# Patient Record
Sex: FEMALE | Race: BLACK OR AFRICAN AMERICAN | NOT HISPANIC OR LATINO | Employment: UNEMPLOYED | URBAN - METROPOLITAN AREA
[De-identification: names, ages, dates, MRNs, and addresses within clinical notes are randomized per-mention and may not be internally consistent; named-entity substitution may affect disease eponyms.]

---

## 2017-10-11 ENCOUNTER — CLINICAL SUPPORT (OUTPATIENT)
Dept: SMOKING CESSATION | Facility: CLINIC | Age: 57
End: 2017-10-11
Payer: COMMERCIAL

## 2017-10-11 DIAGNOSIS — F17.200 NICOTINE DEPENDENCE: Primary | ICD-10-CM

## 2017-10-11 PROCEDURE — 99404 PREV MED CNSL INDIV APPRX 60: CPT | Mod: S$GLB,,,

## 2017-10-11 PROCEDURE — 99999 PR PBB SHADOW E&M-EST. PATIENT-LVL I: CPT | Mod: PBBFAC,,,

## 2017-10-11 RX ORDER — DM/P-EPHED/ACETAMINOPH/DOXYLAM 30-7.5/3
2 LIQUID (ML) ORAL
Qty: 96 LOZENGE | Refills: 0 | Status: SHIPPED | OUTPATIENT
Start: 2017-10-11 | End: 2017-10-11 | Stop reason: SDUPTHER

## 2017-10-11 RX ORDER — IBUPROFEN 200 MG
1 TABLET ORAL DAILY
Qty: 28 PATCH | Refills: 0 | Status: SHIPPED | OUTPATIENT
Start: 2017-10-11 | End: 2017-11-10

## 2017-10-11 RX ORDER — DM/P-EPHED/ACETAMINOPH/DOXYLAM 30-7.5/3
2 LIQUID (ML) ORAL
Qty: 96 LOZENGE | Refills: 0 | Status: SHIPPED | OUTPATIENT
Start: 2017-10-11 | End: 2017-11-10

## 2017-10-11 NOTE — Clinical Note
Pt seen at intake today. She currently smokes 20 cigs/day. Discussed tobacco cessation medication of 21 mg nicotine patch QD and 2 mg nicotine lozenge PRN (1-2 per hour in place of cigarettes). Pt started on rate reduction and wait time of 15 min prior to smoking. Exhaled carbon monoxide level was 18 (0-6 non-smoker). Will see pt back in group in 1 wk.

## 2017-10-11 NOTE — PROGRESS NOTES
See Tobacco Cessation Intake Form for patient assessment and recommendations.  Exhaled carbon monoxide level was 18 ppm per [facility].

## 2017-10-12 ENCOUNTER — TELEPHONE (OUTPATIENT)
Dept: SMOKING CESSATION | Facility: CLINIC | Age: 57
End: 2017-10-12

## 2017-10-12 NOTE — TELEPHONE ENCOUNTER
Returned patient's call. She was told by her son that she could not smoke while wearing nicotine patch. Clarified and explained that she could. But iIf she feels any dizziness, increased heart rate, sweating to put the cigarette out. Not to remove the patch continue smoking and put patch back on.

## 2017-10-18 ENCOUNTER — TELEPHONE (OUTPATIENT)
Dept: SMOKING CESSATION | Facility: CLINIC | Age: 57
End: 2017-10-18

## 2017-10-18 NOTE — TELEPHONE ENCOUNTER
Pt called to let me know why she could not make last nights group. She states that she will be there next Monday.

## 2017-10-23 ENCOUNTER — HOSPITAL ENCOUNTER (EMERGENCY)
Facility: HOSPITAL | Age: 57
Discharge: HOME | End: 2017-10-23
Attending: EMERGENCY MEDICINE
Payer: MEDICARE

## 2017-10-23 VITALS
WEIGHT: 182 LBS | DIASTOLIC BLOOD PRESSURE: 74 MMHG | BODY MASS INDEX: 27.58 KG/M2 | SYSTOLIC BLOOD PRESSURE: 156 MMHG | RESPIRATION RATE: 18 BRPM | OXYGEN SATURATION: 99 % | TEMPERATURE: 98 F | HEART RATE: 59 BPM | HEIGHT: 68 IN

## 2017-10-23 DIAGNOSIS — K04.01 ACUTE PULPITIS: Primary | ICD-10-CM

## 2017-10-23 PROCEDURE — 99283 EMERGENCY DEPT VISIT LOW MDM: CPT

## 2017-10-23 PROCEDURE — 25000003 PHARM REV CODE 250: Performed by: EMERGENCY MEDICINE

## 2017-10-23 RX ORDER — HYDROCODONE BITARTRATE AND ACETAMINOPHEN 5; 325 MG/1; MG/1
1 TABLET ORAL EVERY 6 HOURS PRN
Qty: 20 TABLET | Refills: 0 | Status: SHIPPED | OUTPATIENT
Start: 2017-10-23

## 2017-10-23 RX ORDER — PENICILLIN V POTASSIUM 500 MG/1
500 TABLET, FILM COATED ORAL 4 TIMES DAILY
Qty: 28 TABLET | Refills: 0 | Status: SHIPPED | OUTPATIENT
Start: 2017-10-23 | End: 2017-10-30

## 2017-10-23 RX ORDER — HYDROCODONE BITARTRATE AND ACETAMINOPHEN 5; 325 MG/1; MG/1
1 TABLET ORAL
Status: COMPLETED | OUTPATIENT
Start: 2017-10-23 | End: 2017-10-23

## 2017-10-23 RX ADMIN — HYDROCODONE BITARTRATE AND ACETAMINOPHEN 1 TABLET: 5; 325 TABLET ORAL at 05:10

## 2017-10-23 NOTE — DISCHARGE INSTRUCTIONS
Take medications as directed.  You may also take ibuprofen 600mg every 6 hours.  See your dentist as soon as possible.

## 2017-10-23 NOTE — ED NOTES
Draining abscess to right lower gum line that began 1 day ago and worsened today.  Denies fever.  Having dental surgery in 2 weeks.

## 2017-10-31 ENCOUNTER — TELEPHONE (OUTPATIENT)
Dept: SMOKING CESSATION | Facility: CLINIC | Age: 57
End: 2017-10-31

## 2017-10-31 NOTE — TELEPHONE ENCOUNTER
Called patient to check up on her progress with tobacco cessation. She has not been coming to group. 2nd attempt

## 2017-11-17 NOTE — ED PROVIDER NOTES
Encounter Date: 10/23/2017       History     Chief Complaint   Patient presents with    Oral Swelling     patient states she has a knot to her right bottom gum     57F presents with swelling to right lower gum x 2 days.  No drainage.  It is painful.  She has very poor dentition and is scheduled for dental surgery soon.            Review of patient's allergies indicates:  No Known Allergies  Past Medical History:   Diagnosis Date    Anxiety     Depression     Hypertension      Past Surgical History:   Procedure Laterality Date    MULTIPLE TOOTH EXTRACTIONS       History reviewed. No pertinent family history.  Social History   Substance Use Topics    Smoking status: Current Every Day Smoker     Packs/day: 0.50    Smokeless tobacco: Never Used    Alcohol use No     Review of Systems   Constitutional: Negative for fever.   Gastrointestinal: Negative for nausea and vomiting.   All other systems reviewed and are negative.      Physical Exam     Initial Vitals [10/23/17 1446]   BP Pulse Resp Temp SpO2   136/87 83 16 97.8 °F (36.6 °C) 99 %      MAP       103.33         Physical Exam    Nursing note and vitals reviewed.  Constitutional: She appears well-developed and well-nourished. No distress.   HENT:   Head: Normocephalic and atraumatic.   Mouth/Throat: Oropharynx is clear and moist.   No dental abscess; R lower teeth are caried and tender   Eyes: Conjunctivae are normal.   Neck: Normal range of motion.   Pulmonary/Chest: No respiratory distress.   Musculoskeletal: Normal range of motion.   Neurological: She is alert and oriented to person, place, and time.   Skin: Skin is warm and dry.   Psychiatric: She has a normal mood and affect. Her behavior is normal.         ED Course   Procedures  Labs Reviewed - No data to display          Medical Decision Making:   ED Management:  R lower gum swelling and tenderness - no abscess.  Exam is consistent with acute pulpitis.  Treat with abx.                   ED Course       Clinical Impression:   The encounter diagnosis was Acute pulpitis.                           Berenice Larson MD  11/16/17 3020

## 2017-11-30 ENCOUNTER — CLINICAL SUPPORT (OUTPATIENT)
Dept: SMOKING CESSATION | Facility: CLINIC | Age: 57
End: 2017-11-30
Payer: COMMERCIAL

## 2017-11-30 DIAGNOSIS — F17.200 NICOTINE DEPENDENCE: Primary | ICD-10-CM

## 2017-11-30 PROCEDURE — 99407 BEHAV CHNG SMOKING > 10 MIN: CPT | Mod: S$GLB,,,

## 2017-11-30 PROCEDURE — 99999 PR PBB SHADOW E&M-EST. PATIENT-LVL I: CPT | Mod: PBBFAC,,,

## 2018-05-29 ENCOUNTER — CLINICAL SUPPORT (OUTPATIENT)
Dept: SMOKING CESSATION | Facility: CLINIC | Age: 58
End: 2018-05-29
Payer: COMMERCIAL

## 2018-05-29 DIAGNOSIS — F17.200 NICOTINE DEPENDENCE: Primary | ICD-10-CM

## 2018-05-29 PROCEDURE — 99407 BEHAV CHNG SMOKING > 10 MIN: CPT | Mod: S$GLB,,, | Performed by: INTERNAL MEDICINE

## 2018-05-29 NOTE — PROGRESS NOTES
Spoke with patient today in regard to smoking cessation progress for 3/6 month follow up, she states not tobacco free at this time. Patient states she has cut down a lot using NRTs from previous visits. Informed patient of benefit period, follow up at 1 year, and contact information if any further help or support is needed. Will complete smart form for 3/6 month follow up on Quit attempt #1.

## 2018-06-08 ENCOUNTER — HOSPITAL ENCOUNTER (EMERGENCY)
Facility: HOSPITAL | Age: 58
Discharge: HOME | End: 2018-06-08
Attending: EMERGENCY MEDICINE
Payer: MEDICARE

## 2018-06-08 VITALS
WEIGHT: 174 LBS | RESPIRATION RATE: 16 BRPM | BODY MASS INDEX: 27.31 KG/M2 | OXYGEN SATURATION: 97 % | HEART RATE: 78 BPM | DIASTOLIC BLOOD PRESSURE: 84 MMHG | TEMPERATURE: 98 F | SYSTOLIC BLOOD PRESSURE: 165 MMHG | HEIGHT: 67 IN

## 2018-06-08 DIAGNOSIS — F41.9 ANXIETY: Primary | ICD-10-CM

## 2018-06-08 PROCEDURE — 99282 EMERGENCY DEPT VISIT SF MDM: CPT | Mod: ,,, | Performed by: EMERGENCY MEDICINE

## 2018-06-08 PROCEDURE — 99283 EMERGENCY DEPT VISIT LOW MDM: CPT

## 2018-06-09 NOTE — ED PROVIDER NOTES
"SCRIBE #1 NOTE: I, Yin Zamarripa , am scribing for, and in the presence of,  Pearl Joseph MD. I have scribed the entire note.       CC: Oral Swelling (Pt c/o "bumps on back of tongue". )      HPI: Agueda Conroy is a 57 y.o.  female smoker with HTN and anxiety, who presents to the ED complaining of sensation of abnormal "bumps on the back of [her] tongue." The patient endorses associated anxiety. She denies localized pain, difficulty swallowing, dyspnea, SOB, rhinorrhea, cough, fever, rash, or wound to her tongue. She denies any exposure to new medications, foods, or topicals. Patient reports examining her tongue in the mirror, finding "bumps", and then appreciating a foreign body sensation with swallowing without pain or discomfort.        Past Medical History:   Diagnosis Date    Anxiety     Depression     Hypertension      Past Surgical History:   Procedure Laterality Date    MULTIPLE TOOTH EXTRACTIONS       No family history on file.  No current facility-administered medications on file prior to encounter.      Current Outpatient Prescriptions on File Prior to Encounter   Medication Sig Dispense Refill    clonazepam (KLONOPIN) 0.5 MG tablet Take 0.5 mg by mouth 2 (two) times daily as needed.      hydrocodone-acetaminophen 5-325mg (NORCO) 5-325 mg per tablet Take 1 tablet by mouth every 6 (six) hours as needed for Pain. 20 tablet 0    hydrOXYzine (ATARAX) 25 MG tablet Take 2 tablets (50 mg total) by mouth every 6 (six) hours. 12 tablet 0    ketoprofen (ORUDIS) 50 MG capsule Take 1 capsule (50 mg total) by mouth every 6 (six) hours. 30 capsule 0    levothyroxine (SYNTHROID) 100 MCG tablet Take 100 mcg by mouth once daily.      metoprolol tartrate (LOPRESSOR) 50 MG tablet Take 50 mg by mouth 2 (two) times daily.      verapamil (CALAN) 80 MG tablet Take 80 mg by mouth 3 (three) times daily.       Patient has no known allergies.  Social History     Social History    Marital status: " "     Spouse name: N/A    Number of children: N/A    Years of education: N/A     Social History Main Topics    Smoking status: Current Every Day Smoker     Packs/day: 0.50    Smokeless tobacco: Never Used    Alcohol use No    Drug use: Unknown    Sexual activity: Not Asked     Other Topics Concern    None     Social History Narrative    None       ROS:     Constitutional : neg  HEENT: "Bumps" on tongue.  Resp neg  Cardiac  neg  GI neg   neg  Neuro neg  Heme/Immune: neg  Endo neg  Skin neg  Psych: Anxiety.    PHYSICAL EXAM:  Vitals:    06/08/18 1855   BP: (!) 165/84   Pulse: 78   Resp: 16   Temp: 98.4 °F (36.9 °C)         PHYSICAL EXAM:   general:  tearful, anxious.  VS: triage VS reviewed  HEENT: NC/AT, PERRL, EOMI. Tongue is normal without masses. Oropharynx is symmetrical without erythema and exudates. The abnormalities that the patient is concerned about are the circumvallate papillae at the back of the tongue.  Neck: trachea midline  CV: RRR, no m/r/g, no LE pitting edema  Resp: CTAB  ABD:  ND, + normal BS, NT  Renal: No CVAT  Neuro: AAO x 3, 5/5 muscle strength in upper and lower extremities, sensation grossly intact, face symmetric, speech normal  Ext: no deformity, +2 symmetrical peripheral pulses          DATA & INTERVENTIONS:    LABS reviewed:  Labs Reviewed - No data to display    RADIOLOGY reviewed:  Imaging Results    None         MEDICATIONS/FLUIDS:  Medications - No data to display      MDM: Patient is a 57 y.o. female who presents out of concern for abnormal subjective findings on her tongue. She is tearful and anxious. She denies URI symptoms. Tongue is normal on exam. I have no concern for abnormal findings that the patient is pointing out during physical exam. Pictures of normal tongue anatomy were showed to the patient. Patient was advised if she has any other concerns or if she feels her discomfort or papillae are getting larger to follow up with ENT.         Chart reviewed: EMR " shows previous visits to the ED for multiple unrelated complaints, notably anxiety.    IMPRESSION:  1.) Anxiety    Dispo: Discharge             Pearl Joseph MD  06/08/18 9364

## 2018-10-12 ENCOUNTER — CLINICAL SUPPORT (OUTPATIENT)
Dept: SMOKING CESSATION | Facility: CLINIC | Age: 58
End: 2018-10-12
Payer: COMMERCIAL

## 2018-10-12 DIAGNOSIS — F17.200 NICOTINE DEPENDENCE: Primary | ICD-10-CM

## 2018-10-12 PROCEDURE — 99407 BEHAV CHNG SMOKING > 10 MIN: CPT | Mod: S$GLB,,,

## 2018-10-12 NOTE — PROGRESS NOTES
Called pt to f/u on her 12 month smoking cessation quit status. Pt stated she is still smoking. Informed her she has benefits available and is able to rejoin. Pt scheduled appointment for intake #2 on 10/25/18. Informed patient of benefit period, phone follow ups, and contact information. Will complete and resolve quit #1 episode.

## 2018-11-01 ENCOUNTER — TELEPHONE (OUTPATIENT)
Dept: SMOKING CESSATION | Facility: CLINIC | Age: 58
End: 2018-11-01

## 2018-11-06 ENCOUNTER — CLINICAL SUPPORT (OUTPATIENT)
Dept: SMOKING CESSATION | Facility: CLINIC | Age: 58
End: 2018-11-06
Payer: COMMERCIAL

## 2018-11-06 VITALS
DIASTOLIC BLOOD PRESSURE: 82 MMHG | SYSTOLIC BLOOD PRESSURE: 112 MMHG | WEIGHT: 180 LBS | HEIGHT: 68 IN | BODY MASS INDEX: 27.28 KG/M2

## 2018-11-06 DIAGNOSIS — F17.210 HEAVY CIGARETTE SMOKER (20-39 PER DAY): Primary | ICD-10-CM

## 2018-11-06 PROCEDURE — 99999 PR PBB SHADOW E&M-EST. PATIENT-LVL II: CPT | Mod: PBBFAC,,,

## 2018-11-06 PROCEDURE — 99404 PREV MED CNSL INDIV APPRX 60: CPT | Mod: S$PBB,,, | Performed by: NURSE PRACTITIONER

## 2018-11-06 RX ORDER — IBUPROFEN 200 MG
1 TABLET ORAL DAILY
Refills: 0 | COMMUNITY
Start: 2018-11-06 | End: 2018-12-06

## 2018-11-06 NOTE — Clinical Note
FTND of 9 indicates a high level of nicoting dependency; CESD of 1 is preceived as no mental distress or depression at this time. CO of 22

## 2018-12-02 ENCOUNTER — HOSPITAL ENCOUNTER (EMERGENCY)
Facility: HOSPITAL | Age: 58
Discharge: HOME | End: 2018-12-02
Attending: EMERGENCY MEDICINE
Payer: MEDICARE

## 2018-12-02 VITALS
DIASTOLIC BLOOD PRESSURE: 88 MMHG | TEMPERATURE: 98 F | HEIGHT: 67 IN | SYSTOLIC BLOOD PRESSURE: 140 MMHG | OXYGEN SATURATION: 100 % | BODY MASS INDEX: 28.25 KG/M2 | HEART RATE: 80 BPM | WEIGHT: 180 LBS | RESPIRATION RATE: 18 BRPM

## 2018-12-02 DIAGNOSIS — S39.012A BACK STRAIN, INITIAL ENCOUNTER: Primary | ICD-10-CM

## 2018-12-02 DIAGNOSIS — M54.50 LOW BACK PAIN: ICD-10-CM

## 2018-12-02 PROCEDURE — 99284 EMERGENCY DEPT VISIT MOD MDM: CPT | Mod: 25

## 2018-12-02 PROCEDURE — 96372 THER/PROPH/DIAG INJ SC/IM: CPT

## 2018-12-02 PROCEDURE — 63600175 PHARM REV CODE 636 W HCPCS: Performed by: NURSE PRACTITIONER

## 2018-12-02 RX ORDER — KETOROLAC TROMETHAMINE 30 MG/ML
30 INJECTION, SOLUTION INTRAMUSCULAR; INTRAVENOUS
Status: COMPLETED | OUTPATIENT
Start: 2018-12-02 | End: 2018-12-02

## 2018-12-02 RX ORDER — METHOCARBAMOL 500 MG/1
1000 TABLET, FILM COATED ORAL 3 TIMES DAILY
Qty: 30 TABLET | Refills: 0 | Status: SHIPPED | OUTPATIENT
Start: 2018-12-02 | End: 2018-12-07

## 2018-12-02 RX ORDER — ORPHENADRINE CITRATE 30 MG/ML
60 INJECTION INTRAMUSCULAR; INTRAVENOUS
Status: COMPLETED | OUTPATIENT
Start: 2018-12-02 | End: 2018-12-02

## 2018-12-02 RX ORDER — NAPROXEN 500 MG/1
500 TABLET ORAL 2 TIMES DAILY WITH MEALS
Qty: 10 TABLET | Refills: 0 | Status: SHIPPED | OUTPATIENT
Start: 2018-12-02

## 2018-12-02 RX ADMIN — ORPHENADRINE CITRATE 60 MG: 30 INJECTION INTRAMUSCULAR; INTRAVENOUS at 10:12

## 2018-12-02 RX ADMIN — KETOROLAC TROMETHAMINE 30 MG: 30 INJECTION, SOLUTION INTRAMUSCULAR at 10:12

## 2018-12-02 NOTE — DISCHARGE INSTRUCTIONS
Take prescribed medications as labeled as needed for pain. Do not drive, drink alcohol, or operate machinery while taking Robaxin.  Follow up with [facility] of [facility] in 1-2 days and return to ED for any concerns or worsening symptoms, such as fever or loss of bowel or bladder control.

## 2018-12-02 NOTE — ED PROVIDER NOTES
"Encounter Date: 12/2/2018       History     Chief Complaint   Patient presents with    Back Pain     since yesterday-bent over to pick something up and had sharp pain to mid lower back-now radiating down left leg.     Agueda Conroy 58 y.o.  with Past Medical History:  No date: Anxiety  No date: Depression  No date: Hypertension  who presents to ED with low back pain since yesterday. Patient reports pain to the back on the lower back area that radiates down left leg.  Patient reports that she was bending over picking up boxes yesterday and suddenly felt a "sharp" pain in lower back.  Patient reports tingling down left leg. Denies numbness. Denies loss of bowel or bladder control. Reports taking ibuprofen/motrin and tylenol with no improvement in pain. Denies taking any pain medications today. Patient reports pain is worse with movement and bending. Patient reports that she has not taken blood pressure medications today. Denies any alleviating factors. Denies fever, chills, neck pain/stiffness, weakness, SOB, chest pain, dysuria, and hematuria. Denies any other complaints at this time.       The history is provided by the patient.     Review of patient's allergies indicates:  No Known Allergies  Past Medical History:   Diagnosis Date    Anxiety     Depression     Hypertension      Past Surgical History:   Procedure Laterality Date    MULTIPLE TOOTH EXTRACTIONS       History reviewed. No pertinent family history.  Social History     Tobacco Use    Smoking status: Current Every Day Smoker     Packs/day: 0.50    Smokeless tobacco: Never Used   Substance Use Topics    Alcohol use: No    Drug use: Not on file     Review of Systems   Constitutional: Negative for fever.   Respiratory: Negative for shortness of breath.    Cardiovascular: Negative for chest pain.   Genitourinary: Negative for dysuria.   Musculoskeletal: Positive for back pain. Negative for gait problem, joint swelling, neck pain and neck " stiffness.   Skin: Negative for rash.   Neurological: Negative for weakness and numbness.        +Tingling    Hematological: Does not bruise/bleed easily.   All other systems reviewed and are negative.      Physical Exam     Initial Vitals [12/02/18 0924]   BP Pulse Resp Temp SpO2   (!) 168/85 90 20 98.4 °F (36.9 °C) 98 %      MAP       --         Physical Exam    Vitals reviewed.  Constitutional: She appears well-developed and well-nourished. She is not diaphoretic.  Non-toxic appearance. She does not have a sickly appearance.   HENT:   Head: Atraumatic.   Eyes: EOM are normal.   Neck: Normal range of motion, full passive range of motion without pain and phonation normal. Neck supple.   Cardiovascular: Regular rhythm.   Asymptomatic hypertension.   Pulmonary/Chest: No respiratory distress.   Musculoskeletal:        Back:    TTP to marked area.  No step-off.  Sensory-motor equal bilaterally.  No warmth or surrounding erythema.  No saddle anesthesia.    Neurological: She is oriented to person, place, and time. She has normal strength. No sensory deficit. Gait normal. GCS eye subscore is 4. GCS verbal subscore is 5. GCS motor subscore is 6.   Steady gait.    Skin: Skin is warm, dry and intact. No rash noted.   Psychiatric: She has a normal mood and affect.         ED Course   Procedures  Labs Reviewed - No data to display       Imaging Results          X-Ray Lumbar Spine Ap And Lateral (Final result)  Result time 12/02/18 09:59:29    Final result by Branden Luna DO (12/02/18 09:59:29)                 Impression:      1.  As above      Electronically signed by: Branden Luna DO  Date:    12/02/2018  Time:    09:59             Narrative:    EXAMINATION:  XR LUMBAR SPINE AP AND LATERAL    CLINICAL HISTORY:  Low back pain, minor trauma;Low back pain    TECHNIQUE:  AP, lateral and spot images were performed of the lumbar spine.    COMPARISON:  None    FINDINGS:  The vertebral bodies demonstrate normal height.  There  is mild grade 1 spondylolisthesis of L3 on L4. Mild-to-moderate disc space narrowing noted at the L3-4 through the L5-S1 levels.  Prominent multilevel facet arthropathy noted.                                 Medical Decision Making:   History:   Old Medical Records: I decided to obtain old medical records.  Initial Assessment:   Pt presents to ED with low back pain since yesterday. Appears well, non-toxic. Afebrile. Sensory-motor equal bilaterally. Steady gait. No s/s of cauda equina.  Clinical Tests:   Radiological Study: Reviewed and Ordered  ED Management:  IM toradol, norflex, Xray lumbar  Other:   I discussed test(s) with the performing physician.       <> Summary of the Findings: Care discussed with Dr. Mosley who agrees with this patient's ED course and disposition.   No red flags on presentation or physical exam. Unlikely to be spinal stenosis as there are no neurologic findings, does not appear to be infectious given no fever, no point tenderness, coulg be DJD or disc herniation but xray normal and no red flags that would prompt further imaging. Likely musculoskeletal pain. I will d/c home with anti-inflammatories and muscle relaxer. Will instruct pt to follow up with PCP in 1-2 days if symptoms do not improve. We disscused at length warning signs for return including but not limited to increased pain, change in gait, sensation changes around the rectum or perineum, bowel or bladder issues, fever and the pt understands. The pt is comfortable going home at this time. The patient remained comfortable and stable during their visit in the ED.  Discharge and follow-up instructions discussed with the patient who expressed understanding and willingness to comply with my recommendations.                         Clinical Impression:   The primary encounter diagnosis was Back strain, initial encounter. A diagnosis of Low back pain was also pertinent to this visit.                             Wolf Obrien,  ROMEO  12/02/18 1745

## 2018-12-02 NOTE — ED NOTES
APPEARANCE: Alert, oriented, grimacing and tearful   CARDIAC: BP/pulse elevated   PERIPHERAL VASCULAR: peripheral pulses present. Normal cap refill. No edema. Warm to touch.    RESPIRATORY:Normal rate and effort, breath sounds clear bilaterally throughout chest. Respirations are equal and unlabored no obvious signs of distress.  GASTRO: soft, bowel sounds normal, no tenderness, no abdominal distention.  MUSC: severe lower back pain with movement/radiating down the left leg .  SKIN: Skin is warm and dry, normal skin turgor, mucous membranes moist.  NEURO: 5/5 strength major flexors/extensors bilaterally. Sensory intact to light touch bilaterally. Iona coma scale: eyes open spontaneously-4, oriented & converses-5, obeys commands-6. No neurological abnormalities.   MENTAL STATUS: awake, alert and aware of environment.  EYE: PERRL, both eyes: pupils brisk and reactive to light. Normal size..

## 2018-12-06 ENCOUNTER — CLINICAL SUPPORT (OUTPATIENT)
Dept: SMOKING CESSATION | Facility: CLINIC | Age: 58
End: 2018-12-06
Payer: COMMERCIAL

## 2018-12-06 DIAGNOSIS — F17.210 HEAVY CIGARETTE SMOKER (20-39 PER DAY): ICD-10-CM

## 2018-12-06 DIAGNOSIS — F17.210 MODERATE CIGARETTE SMOKER (10-19 PER DAY): Primary | ICD-10-CM

## 2018-12-06 PROCEDURE — 99406 BEHAV CHNG SMOKING 3-10 MIN: CPT | Mod: S$PBB,,, | Performed by: FAMILY MEDICINE

## 2018-12-06 PROCEDURE — 99999 PR PBB SHADOW E&M-EST. PATIENT-LVL I: CPT | Mod: PBBFAC,,,

## 2018-12-06 RX ORDER — IBUPROFEN 200 MG
1 TABLET ORAL DAILY
Qty: 14 PATCH | Refills: 0 | Status: SHIPPED | OUTPATIENT
Start: 2018-12-06

## 2019-05-30 ENCOUNTER — CLINICAL SUPPORT (OUTPATIENT)
Dept: SMOKING CESSATION | Facility: CLINIC | Age: 59
End: 2019-05-30
Payer: COMMERCIAL

## 2019-05-30 DIAGNOSIS — F17.200 NICOTINE DEPENDENCE: Primary | ICD-10-CM

## 2019-05-30 PROCEDURE — 99407 BEHAV CHNG SMOKING > 10 MIN: CPT | Mod: S$GLB,,, | Performed by: INTERNAL MEDICINE

## 2019-05-30 PROCEDURE — 99407 PR TOBACCO USE CESSATION INTENSIVE >10 MINUTES: ICD-10-PCS | Mod: S$GLB,,, | Performed by: INTERNAL MEDICINE

## 2019-05-30 NOTE — PROGRESS NOTES
Spoke with patient today in regard to smoking cessation progress 3/6 month telephone follow up, she states not tobacco free. Patient states she was able to cut down on cigarette inatke tremendously using the prescribed tobacco cessation medication of nicotine patches and gum. She state wanting to return to the program and would call back once she has her calendar. Commended patient on the accomplishment thus far. Informed patient of benefit period, future follow up, and contact information if any further help or support is needed. Will complete smart form for 3 and 6 month follow up on Quit attempt #2.

## 2019-11-20 ENCOUNTER — CLINICAL SUPPORT (OUTPATIENT)
Dept: SMOKING CESSATION | Facility: CLINIC | Age: 59
End: 2019-11-20
Payer: COMMERCIAL

## 2019-11-20 DIAGNOSIS — F17.200 NICOTINE DEPENDENCE: Primary | ICD-10-CM

## 2019-11-20 PROCEDURE — 99407 BEHAV CHNG SMOKING > 10 MIN: CPT | Mod: S$PBB,,,

## 2019-11-20 PROCEDURE — 99407 PR TOBACCO USE CESSATION INTENSIVE >10 MINUTES: ICD-10-PCS | Mod: S$PBB,,,

## 2019-11-20 NOTE — PROGRESS NOTES
Spoke with patient today in regard to smoking cessation progress 12-month phone follow up, she states not tobacco free. Patient states wanting to return to the program and would like to schedule an appointment, has one for December 4th.  Informed patient of benefit period and contact information if any further help or support is needed. Will complete smart form for 12 month follow up on Quit attempt #2.

## 2019-12-05 ENCOUNTER — TELEPHONE (OUTPATIENT)
Dept: SMOKING CESSATION | Facility: CLINIC | Age: 59
End: 2019-12-05

## 2019-12-05 NOTE — TELEPHONE ENCOUNTER
Left a message regarding a missed Smoking Cessation appointment and gave my number to reschedule. [phone].

## 2020-02-06 ENCOUNTER — TELEPHONE (OUTPATIENT)
Dept: SMOKING CESSATION | Facility: CLINIC | Age: 60
End: 2020-02-06

## 2020-02-06 NOTE — TELEPHONE ENCOUNTER
Left a message regarding a missed Smoking Cessation appointment and gave my number to reschedule. [phone].

## 2020-02-12 ENCOUNTER — TELEPHONE (OUTPATIENT)
Dept: SMOKING CESSATION | Facility: CLINIC | Age: 60
End: 2020-02-12

## 2020-02-12 NOTE — TELEPHONE ENCOUNTER
Spoke with patient she stated she is going through some extra stress right now and she will call me when she is ready to start quitting.